# Patient Record
Sex: MALE | Race: WHITE
[De-identification: names, ages, dates, MRNs, and addresses within clinical notes are randomized per-mention and may not be internally consistent; named-entity substitution may affect disease eponyms.]

---

## 2017-04-22 ENCOUNTER — HOSPITAL ENCOUNTER (OUTPATIENT)
Dept: HOSPITAL 45 - C.LABPVFM | Age: 82
Discharge: HOME | End: 2017-04-22
Attending: GENERAL PRACTICE
Payer: COMMERCIAL

## 2017-04-22 DIAGNOSIS — I25.10: Primary | ICD-10-CM

## 2017-04-22 LAB
ALBUMIN/GLOB SERPL: 1.1 {RATIO} (ref 0.9–2)
ALP SERPL-CCNC: 63 U/L (ref 45–117)
ALT SERPL-CCNC: 31 U/L (ref 12–78)
ANION GAP SERPL CALC-SCNC: 8 MMOL/L (ref 3–11)
AST SERPL-CCNC: 24 U/L (ref 15–37)
BUN SERPL-MCNC: 26 MG/DL (ref 7–18)
BUN/CREAT SERPL: 23.5 (ref 10–20)
CALCIUM SERPL-MCNC: 9.1 MG/DL (ref 8.5–10.1)
CHLORIDE SERPL-SCNC: 111 MMOL/L (ref 98–107)
CHOLEST/HDLC SERPL: 2.9 {RATIO}
CO2 SERPL-SCNC: 25 MMOL/L (ref 21–32)
CREAT SERPL-MCNC: 1.1 MG/DL (ref 0.6–1.4)
GLOBULIN SER-MCNC: 3 GM/DL (ref 2.5–4)
GLUCOSE SERPL-MCNC: 99 MG/DL (ref 70–99)
GLUCOSE UR QL: 60 MG/DL
KETONES UR QL STRIP: 92 MG/DL
NITRITE UR QL STRIP: 95 MG/DL (ref 0–150)
PH UR: 171 MG/DL (ref 0–200)
POTASSIUM SERPL-SCNC: 4.3 MMOL/L (ref 3.5–5.1)
SODIUM SERPL-SCNC: 144 MMOL/L (ref 136–145)
VERY LOW DENSITY LIPOPROT CALC: 19 MG/DL

## 2017-05-06 ENCOUNTER — HOSPITAL ENCOUNTER (EMERGENCY)
Dept: HOSPITAL 45 - C.EDB | Age: 82
Discharge: HOME | End: 2017-05-06
Payer: COMMERCIAL

## 2017-05-06 VITALS
WEIGHT: 201.28 LBS | BODY MASS INDEX: 30.51 KG/M2 | BODY MASS INDEX: 30.51 KG/M2 | HEIGHT: 67.99 IN | HEIGHT: 67.99 IN | WEIGHT: 201.28 LBS

## 2017-05-06 VITALS — TEMPERATURE: 98.06 F

## 2017-05-06 VITALS — HEART RATE: 72 BPM | OXYGEN SATURATION: 93 %

## 2017-05-06 VITALS — OXYGEN SATURATION: 100 %

## 2017-05-06 VITALS — OXYGEN SATURATION: 96 % | HEART RATE: 101 BPM

## 2017-05-06 VITALS — SYSTOLIC BLOOD PRESSURE: 139 MMHG | DIASTOLIC BLOOD PRESSURE: 88 MMHG

## 2017-05-06 DIAGNOSIS — Z83.3: ICD-10-CM

## 2017-05-06 DIAGNOSIS — J44.9: Primary | ICD-10-CM

## 2017-05-06 DIAGNOSIS — Z82.49: ICD-10-CM

## 2017-05-06 LAB
ALBUMIN/GLOB SERPL: 1.1 {RATIO} (ref 0.9–2)
ALP SERPL-CCNC: 60 U/L (ref 45–117)
ALT SERPL-CCNC: 23 U/L (ref 12–78)
ANION GAP SERPL CALC-SCNC: 6 MMOL/L (ref 3–11)
APPEARANCE UR: (no result)
AST SERPL-CCNC: 27 U/L (ref 15–37)
BASOPHILS # BLD: 0.03 K/UL (ref 0–0.2)
BASOPHILS NFR BLD: 0.5 %
BILIRUB UR-MCNC: (no result) MG/DL
BUN SERPL-MCNC: 21 MG/DL (ref 7–18)
BUN/CREAT SERPL: 19.3 (ref 10–20)
CALCIUM SERPL-MCNC: 8.3 MG/DL (ref 8.5–10.1)
CHLORIDE SERPL-SCNC: 108 MMOL/L (ref 98–107)
CKMB/CK RATIO: 2.7 (ref 0–3)
CO2 SERPL-SCNC: 31 MMOL/L (ref 21–32)
COLOR UR: YELLOW
COMPLETE: YES
CREAT CL PREDICTED SERPL C-G-VRATE: 58.7 ML/MIN
CREAT SERPL-MCNC: 1.1 MG/DL (ref 0.6–1.4)
EOSINOPHIL NFR BLD AUTO: 168 K/UL (ref 130–400)
GLOBULIN SER-MCNC: 3 GM/DL (ref 2.5–4)
GLUCOSE SERPL-MCNC: 90 MG/DL (ref 70–99)
HCT VFR BLD CALC: 40.8 % (ref 42–52)
IG%: 1.1 %
IMM GRANULOCYTES NFR BLD AUTO: 22.2 %
LYMPHOCYTES # BLD: 1.23 K/UL (ref 1.2–3.4)
MANUAL MICROSCOPIC REQUIRED?: NO
MCH RBC QN AUTO: 32 PG (ref 25–34)
MCHC RBC AUTO-ENTMCNC: 33.3 G/DL (ref 32–36)
MCV RBC AUTO: 96 FL (ref 80–100)
MONOCYTES NFR BLD: 13 %
NEUTROPHILS # BLD AUTO: 3.1 %
NEUTROPHILS NFR BLD AUTO: 60.1 %
NITRITE UR QL STRIP: (no result)
PH UR STRIP: 5 [PH] (ref 4.5–7.5)
PMV BLD AUTO: 10.8 FL (ref 7.4–10.4)
POTASSIUM SERPL-SCNC: 4 MMOL/L (ref 3.5–5.1)
RBC # BLD AUTO: 4.25 M/UL (ref 4.7–6.1)
REVIEW REQ?: NO
SODIUM SERPL-SCNC: 145 MMOL/L (ref 136–145)
SP GR UR STRIP: 1.02 (ref 1–1.03)
URINE BILL WITH OR WITHOUT MIC: (no result)
URINE EPITHELIAL CELL AUTO: >30 /LPF (ref 0–5)
UROBILINOGEN UR-MCNC: (no result) MG/DL
WBC # BLD AUTO: 5.54 K/UL (ref 4.8–10.8)

## 2017-05-06 NOTE — EMERGENCY ROOM VISIT NOTE
History


Report prepared by [place]:  Cheri Silva


Under the Supervision of:  Dr. Arjun Casey M.D.


First contact with patient:  17:25


Chief Complaint:  RESPIRATORY PROBLEMS


Stated Complaint:  ADDED WEIGHT,DIFFICULTY BREATHING





History of Present Illness


The patient is an 81 year old male who presents to the Emergency Room with 

complaints of persistent SOB for the past few days. He has a history of CHF and 

is on Lasix which he takes every other day. For the past few days he has had to 

take Lasix everyday because of increasing weight gain and SOB. He felt short of 

breath when lying flat and noticed that his abdomen was swollen. He is also 

increasingly SOB when walking. He has gained 4-7 lbs in the last couple of 

days. He denies any chest pain or abdominal pain. He is not on oxygen at home. 

He has inhalers at home. He has a history of quadruple bypass and has a stent 

in place. He has a pacemaker.





   Source of History:  patient


   Onset:  few days ago


   Position:  other (respiratory)


   Quality:  other (SOB)


   Timing:  other (persistent)


   Modifying Factors (Worsening):  exertion, other (lying flat)


   Associated Symptoms:  No abdominal pain, No chest pain


Note:


Pt reports weight gain, swollen abdomen.





Review of Systems


See HPI for pertinent positives & negatives. A total of 10 systems reviewed and 

were otherwise negative.





Past Medical & Surgical


Medical Problems:


(1) Asthma


(2) Benign hypertension


(3) CAD (coronary artery disease)


(4) Colonoscopic polypectomy


(5) COPD (chronic obstructive pulmonary disease)


(6) Coronary artery bypass grafts x 4


(7) Coronary artery disease


(8) Duodenal ulcer


(9) Gastroesophageal reflux disease


(10) Hyperlipidemia


(11) Ischemic cardiomyopathy


(12) Non-ST elevated myocardial infarction


(13) Stented coronary artery


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


Surgical Problems:


(1) Hx of CABG


(2) S/P placement of cardiac pacemaker








Family History





Heart disease


Hypertension





Social History


Smoking Status:  Never Smoker


Alcohol Use:  none


Drug Use:  none


Marital Status:  


Housing Status:  lives with significant other


Occupation Status:  retired





Current/Historical Medications


Scheduled


Albuterol Sulfate (Albuterol Sulfate), 1 VIAL NEB BID


Ascorbic Acid (Vitamin C), 100 MG PO BID


Aspirin (Aspirin Ec), 81 MG PO DAILY


Cholecalciferol (Vitamin D3), 1 TAB PO QPM


Clopidogrel (Plavix), 75 MG PO DAILY


Coenzyme Q10 (Ubidecarenone) (Coq-10), 100 MG PO QPM


Fish Oil (Omega-3), 1 CAP PO BID


Fluticasone Furoate-Vilanterol (Breo Ellipta), 1 PUFF INH DAILY


Glucosamine Hydrochloride (Glucosamine), 1,500 MG PO BID


Isosorbide Mononitrate (Isosorbide Mononitrate ER), 60 MG PO DAILY


Lecithin (Lecithin), 1 DOSE PO DAILY


Losartan Potassium (Cozaar), 1 TAB PO QPM


Metoprolol Tartrate (Lopressor) (Lopressor), 50 MG PO BID


Mometasone Furoate-Formoterol (Dulera 200/5 Mcg), 2 PUFFS INH BID


Multivitamins/Minerals (Mvi With Minerals), 1 TAB PO DAILY


Nitroglycerin (Nitrostat), 0.4 MG UT PRN


Prednisone (Prednisone Tab), 0 PO DAILY


Ranitidine (Zantac), 75 MG PO QPM


Simvastatin (Zocor), 40 MG PO QPM


Terazosin Hcl (Hytrin), 2 MG PO QPM





Scheduled PRN


Albuterol Hfa (Ventolin Hfa), 1-2 PUFFS INH DAILY PRN for SOB/Wheezing


Furosemide (Lasix), 40 MG PO DAILY PRN for EDEMA





Allergies


Coded Allergies:  


     Fluticasone (Unverified  Adverse Reaction, Intermediate, DIZZY, 5/6/17)


     Milk Protein Extract (Unverified  Adverse Reaction, Intermediate, DIZZY, 5/ 6/17)


     Salmeterol (Unverified  Adverse Reaction, Intermediate, DIZZY, 5/6/17)





Physical Exam


Vital Signs











  Date Time  Temp Pulse Resp B/P Pulse Ox O2 Delivery O2 Flow Rate FiO2


 


5/6/17 20:12    139/88    


 


5/6/17 19:56  101 18  96 Room Air  


 


5/6/17 19:32  98      


 


5/6/17 18:48     99   


 


5/6/17 18:48  73 18 138/70 100 Room Air  


 


5/6/17 18:48     100   


 


5/6/17 18:34  72 20  93 Room Air  21


 


5/6/17 16:57 36.7 91 22 121/78 93 Room Air  











Physical Exam


GENERAL: Patient is a healthy-appearing well-nourished


HEAD: Normocephalic atraumatic


EYES: Ocular movements intact pupils equal and react to light


OROPHARYNX mucous membranes are moist no exudates present no erythema or edema 

present


NECK: Supple no nuchal rigidity


CHEST: Good equal expansion


LUNGS: Clear and equal to auscultation


CARDIAC: Normal S1 and S2


ABDOMEN: Soft nontender no guarding


BACK: No CVA tenderness


EXTREMITIES: No pain upon palpation normal muscle strength in all groups no 

clubbing cyanosis or edema


NEURO: Patient is following commands is answering questions appropriately. 

Alert and oriented x3 Cranial Nerves 2-12 grossly intact





Medical Decision & Procedures


ER Provider


Diagnostic Interpretation:


X-ray results as stated below per interpretation by me and the radiologist. 

Radiology results as stated below per my review and radiologist interpretation:





CHEST ONE VIEW PORTABLE





CLINICAL HISTORY: Pt c/o SOB dyspnea





COMPARISON STUDY:  8/5/2016





FINDINGS: Moderate stable cardiomegaly. Cardiac pacemaker/defibrillator. Prior


median sternotomy. Lungs are clear. 





IMPRESSION:  Moderate stable cardiomegaly. Otherwise negative study 





Electronically signed by:  Rip Krishnan M.D.


5/6/2017 6:30 PM





Dictated Date/Time:  5/6/2017 6:29 PM








CHEST CTA for PULMONARY ARTERIES





CT DOSE: 594.87 mGy.cm





HISTORY: Chest  dyspnea





TECHNIQUE: Multiaxial CT images of the chest were performed following the


intravenous administration of contrast to evaluate the pulmonary arteries.


Maximal intensity projection images were also obtained.





COMPARISON STUDY: None.





FINDINGS: There is a normal caliber thoracic aorta with no evidence for


dissection. There is no evidence for pulmonary embolus. No pleural effusions. No


pneumothorax. The liver and spleen are unremarkable. No mediastinal or hilar


lymphadenopathy. The central airways are patent. The lungs are clear. No focal


infiltrate. Large fixed lateral hernia. Prior median sternotomy. Cardiac


pacemaker.





IMPRESSION: 


No evidence for pulmonary embolus. Large fixed hiatal hernia. Lungs are clear.





Electronically signed by:  Rip Krishnan M.D.


5/6/2017 7:34 PM





Dictated Date/Time:  5/6/2017 7:32 PM





Laboratory Results


5/6/17 17:56








Red Blood Count 4.25, Mean Corpuscular Volume 96.0, Mean Corpuscular Hemoglobin 

32.0, Mean Corpuscular Hemoglobin Concent 33.3, Mean Platelet Volume 10.8, 

Neutrophils (%) (Auto) 60.1, Lymphocytes (%) (Auto) 22.2, Monocytes (%) (Auto) 

13.0, Eosinophils (%) (Auto) 3.1, Basophils (%) (Auto) 0.5, Neutrophils # (Auto

) 3.33, Lymphocytes # (Auto) 1.23, Monocytes # (Auto) 0.72, Eosinophils # (Auto

) 0.17, Basophils # (Auto) 0.03





5/6/17 17:56

















Test


  5/6/17


17:56 5/6/17


19:12


 


White Blood Count


  5.54 K/uL


(4.8-10.8) 


 


 


Red Blood Count


  4.25 M/uL


(4.7-6.1) 


 


 


Hemoglobin


  13.6 g/dL


(14.0-18.0) 


 


 


Hematocrit 40.8 % (42-52)  


 


Mean Corpuscular Volume


  96.0 fL


() 


 


 


Mean Corpuscular Hemoglobin


  32.0 pg


(25-34) 


 


 


Mean Corpuscular Hemoglobin


Concent 33.3 g/dl


(32-36) 


 


 


Platelet Count


  168 K/uL


(130-400) 


 


 


Mean Platelet Volume


  10.8 fL


(7.4-10.4) 


 


 


Neutrophils (%) (Auto) 60.1 %  


 


Lymphocytes (%) (Auto) 22.2 %  


 


Monocytes (%) (Auto) 13.0 %  


 


Eosinophils (%) (Auto) 3.1 %  


 


Basophils (%) (Auto) 0.5 %  


 


Neutrophils # (Auto)


  3.33 K/uL


(1.4-6.5) 


 


 


Lymphocytes # (Auto)


  1.23 K/uL


(1.2-3.4) 


 


 


Monocytes # (Auto)


  0.72 K/uL


(0.11-0.59) 


 


 


Eosinophils # (Auto)


  0.17 K/uL


(0-0.5) 


 


 


Basophils # (Auto)


  0.03 K/uL


(0-0.2) 


 


 


RDW Standard Deviation


  49.4 fL


(36.4-46.3) 


 


 


RDW Coefficient of Variation


  13.9 %


(11.5-14.5) 


 


 


Immature Granulocyte % (Auto) 1.1 %  


 


Immature Granulocyte # (Auto)


  0.06 K/uL


(0.00-0.02) 


 


 


Red Blood Cell Morphology Unremarkable  


 


Anion Gap


  6.0 mmol/L


(3-11) 


 


 


Est Creatinine Clear Calc


Drug Dose 58.7 ml/min 


  


 


 


Estimated GFR (


American) 73.1 


  


 


 


Estimated GFR (Non-


American 63.1 


  


 


 


BUN/Creatinine Ratio 19.3 (10-20)  


 


Calcium Level


  8.3 mg/dl


(8.5-10.1) 


 


 


Total Bilirubin


  0.5 mg/dl


(0.2-1) 


 


 


Aspartate Amino Transf


(AST/SGOT) 27 U/L (15-37) 


  


 


 


Alanine Aminotransferase


(ALT/SGPT) 23 U/L (12-78) 


  


 


 


Alkaline Phosphatase


  60 U/L


() 


 


 


Total Creatine Kinase


  89 U/L


() 


 


 


Creatine Kinase MB


  2.4 ng/ml


(0.5-3.6) 


 


 


Creatine Kinase MB Ratio 2.7 (0-3.0)  


 


Troponin I


  0.017 ng/ml


(0-0.045) 


 


 


Pro-B-Type Natriuretic Peptide


  612 pg/ml


(0-1800) 


 


 


Total Protein


  6.2 gm/dl


(6.4-8.2) 


 


 


Albumin


  3.2 gm/dl


(3.4-5.0) 


 


 


Globulin


  3.0 gm/dl


(2.5-4.0) 


 


 


Albumin/Globulin Ratio 1.1 (0.9-2)  


 


Chemistry Specimen Hemolysis   


 


Urine Color  YELLOW 


 


Urine Appearance  CLOUDY (CLEAR) 


 


Urine pH  5.0 (4.5-7.5) 


 


Urine Specific Gravity


  


  1.017


(1.000-1.030)


 


Urine Protein  NEG (NEG) 


 


Urine Glucose (UA)  NEG (NEG) 


 


Urine Ketones  TRACE (NEG) 


 


Urine Occult Blood  NEG (NEG) 


 


Urine Nitrite  NEG (NEG) 


 


Urine Bilirubin  NEG (NEG) 


 


Urine Urobilinogen  NEG (NEG) 


 


Urine Leukocyte Esterase  NEG (NEG) 


 


Urine WBC (Auto)  1-5 /hpf (0-5) 


 


Urine RBC (Auto)  0-4 /hpf (0-4) 


 


Urine Hyaline Casts (Auto)  1-5 /lpf (0-5) 


 


Urine Epithelial Cells (Auto)  >30 /lpf (0-5) 


 


Urine Bacteria (Auto)  NEG (NEG) 





Labs reviewed by ED physician.





Medications Administered











 Medications


  (Trade)  Dose


 Ordered  Sig/Carla


 Route  Start Time


 Stop Time Status Last Admin


Dose Admin


 


 Albuterol/


 Ipratropium


  (Duoneb)  12 ml  ONE  ONCE


 INH  5/6/17 17:45


 5/6/17 17:46 DC 5/6/17 17:45


12 ML


 


 Methylprednisolone


 Sodium Succinate


  (Solu-Medrol IV)  60 mg  NOW  STAT


 IV  5/6/17 19:48


 5/6/17 19:50 DC 5/6/17 20:01


60 MG


 


 Prednisone


  (PredniSONE TAB)  40 mg  NOW  STAT


 PO  5/6/17 20:07


 5/6/17 20:08 DC 5/6/17 20:10


40 MG











ECG


Indication:  SOB/dyspnea


Rate (beats per minute):  70


Rhythm:  other (dual paced rhythm)


Findings:  no acute ischemic change, no ectopy





ED Course


1726: Past medical records reviewed. The patient was evaluated in room B4B. A 

complete history and physical examination was performed. 





1745: Duoneb 12 ml INH.





1942: Upon reexamination the patient is resting comfortably. I discussed 

results and treatment plan with the patient and his family. They verbalize 

agreement and understanding. The patient is ready for discharge.





1948: Solu-Medrol IV 60 mg IV.





Medical Decision


Prior records/ancillary studies reviewed.


Triage Nursing notes reviewed.


Additional history obtained from the family.





The patient's history was concerning for respiratory difficulties.





Differential diagnosis:


Etiologies such as infections, reactive airway disease, pneumonia, pneumothorax

, COPD, CHF, cardiac ischemia, pulmonary embolism, musculoskeletal, 

gastrointestinal, as well as others were entertained.





This is an 81-year-old male who presents emergency department complaining of 4 

pound weight can.  The patient is concern that he is filled with fluid.  He has 

no evidence of pitting edema on exam.  He has no evidence of rails on exam.  He 

has no evidence of pulmonary edema on chest x-ray.  In addition the patient's 

BNP is normal.  The patient was given an hour-long breathing treatment in the 

emergency department.  He is not hypoxic.  Based on these multiple findings I 

felt that the patient as well as to be discharged home.  He was sent for CAT 

scan of the chest which did not show any evidence of a PE.  The patient will 

therefore be certain on Solu-Medrol and I feels well enough to be continued on 

prednisone for the next several days.  Patient and family are in agreement with 

the treatment plan.





Impression





 Primary Impression:  


 COPD (chronic obstructive pulmonary disease)





Scribe Attestation


The scribe's documentation has been prepared under my direction and personally 

reviewed by me in its entirety. I confirm that the note above accurately 

reflects all work, treatment, procedures, and medical decision making performed 

by me.





Departure Information


Dispostion


Home / Self-Care





Prescriptions





Prednisone (Prednisone Tab) 20 Mg Tab


0 PO DAILY, #7 TAB


   2 TABS DAILY FOR 2 DAYS, THEN 1 TAB DAILY FOR 2 DAYS, THEN 1/2 TAB


   DAILY FOR 2 DAYS.


   Prov: Arjun Casey MD         5/6/17





Referrals


Kirk Bryan M.D. (PCP)








Sumanth Trinidad MD





Forms


HOME CARE DOCUMENTATION FORM,                                                 

               IMPORTANT VISIT INFORMATION, WORK / SCHOOL INSTRUCTIONS





Patient Instructions


COPD - [facility], [facility]





Additional Instructions





Follow up with DR Trinidad's office


 


Continue to use inhalers and Lasix as directed











You have been examined and treated today on an emergency basis only. This is 

not a substitute for, or an effort to provide, complete comprehensive medical 

care. It is impossible to recognize and treat all injuries or illnesses in a 

single emergency department visit. It is therefore important that you follow up 

closely with Dr Bryan.  Call as soon as possible for an appointment.  





Thank you for your time and consideration.  I look forward to speaking with you 

again soon.  Please don't hesitate to call us if you have any questions.





Problem Qualifiers








 Primary Impression:  


 COPD (chronic obstructive pulmonary disease)


 COPD type:  unspecified COPD  Qualified Codes:  J44.9 - Chronic obstructive 

pulmonary disease, unspecified

## 2017-05-06 NOTE — DIAGNOSTIC IMAGING REPORT
CHEST CTA for PULMONARY ARTERIES



CT DOSE: 594.87 mGy.cm



HISTORY: Chest  dyspnea



TECHNIQUE: Multiaxial CT images of the chest were performed following the

intravenous administration of contrast to evaluate the pulmonary arteries.

Maximal intensity projection images were also obtained.



COMPARISON STUDY: None.



FINDINGS: There is a normal caliber thoracic aorta with no evidence for

dissection. There is no evidence for pulmonary embolus. No pleural effusions. No

pneumothorax. The liver and spleen are unremarkable. No mediastinal or hilar

lymphadenopathy. The central airways are patent. The lungs are clear. No focal

infiltrate. Large fixed lateral hernia. Prior median sternotomy. Cardiac

pacemaker.



IMPRESSION: 

No evidence for pulmonary embolus. Large fixed hiatal hernia. Lungs are clear.







Electronically signed by:  Rip Krishnan M.D.

5/6/2017 7:34 PM



Dictated Date/Time:  5/6/2017 7:32 PM

## 2017-05-06 NOTE — DIAGNOSTIC IMAGING REPORT
CHEST ONE VIEW PORTABLE



CLINICAL HISTORY: Pt c/o SOB dyspnea



COMPARISON STUDY:  8/5/2016



FINDINGS: Moderate stable cardiomegaly. Cardiac pacemaker/defibrillator. Prior

median sternotomy. Lungs are clear. 



IMPRESSION:  Moderate stable cardiomegaly. Otherwise negative study 









Electronically signed by:  Rip Krishnan M.D.

5/6/2017 6:30 PM



Dictated Date/Time:  5/6/2017 6:29 PM

## 2018-02-12 ENCOUNTER — HOSPITAL ENCOUNTER (INPATIENT)
Dept: HOSPITAL 45 - C.EDB | Age: 83
LOS: 2 days | Discharge: HOME | DRG: 190 | End: 2018-02-14
Attending: HOSPITALIST | Admitting: HOSPITALIST
Payer: COMMERCIAL

## 2018-02-12 VITALS
OXYGEN SATURATION: 94 % | DIASTOLIC BLOOD PRESSURE: 57 MMHG | SYSTOLIC BLOOD PRESSURE: 100 MMHG | TEMPERATURE: 80.24 F | HEART RATE: 75 BPM

## 2018-02-12 VITALS
HEIGHT: 68 IN | BODY MASS INDEX: 29.9 KG/M2 | HEIGHT: 68 IN | WEIGHT: 197.31 LBS | WEIGHT: 197.31 LBS | BODY MASS INDEX: 29.9 KG/M2

## 2018-02-12 DIAGNOSIS — Z79.82: ICD-10-CM

## 2018-02-12 DIAGNOSIS — K21.9: ICD-10-CM

## 2018-02-12 DIAGNOSIS — I50.22: ICD-10-CM

## 2018-02-12 DIAGNOSIS — J44.0: ICD-10-CM

## 2018-02-12 DIAGNOSIS — Z88.8: ICD-10-CM

## 2018-02-12 DIAGNOSIS — E66.3: ICD-10-CM

## 2018-02-12 DIAGNOSIS — J20.8: ICD-10-CM

## 2018-02-12 DIAGNOSIS — I25.10: ICD-10-CM

## 2018-02-12 DIAGNOSIS — J06.9: ICD-10-CM

## 2018-02-12 DIAGNOSIS — J45.909: ICD-10-CM

## 2018-02-12 DIAGNOSIS — Z95.0: ICD-10-CM

## 2018-02-12 DIAGNOSIS — J96.20: ICD-10-CM

## 2018-02-12 DIAGNOSIS — Z79.899: ICD-10-CM

## 2018-02-12 DIAGNOSIS — Z95.1: ICD-10-CM

## 2018-02-12 DIAGNOSIS — J44.1: Primary | ICD-10-CM

## 2018-02-12 DIAGNOSIS — E78.5: ICD-10-CM

## 2018-02-12 DIAGNOSIS — Z51.81: ICD-10-CM

## 2018-02-12 DIAGNOSIS — I25.2: ICD-10-CM

## 2018-02-12 DIAGNOSIS — Z82.49: ICD-10-CM

## 2018-02-12 DIAGNOSIS — Z91.011: ICD-10-CM

## 2018-02-12 DIAGNOSIS — I11.0: ICD-10-CM

## 2018-02-12 LAB
BASOPHILS # BLD: 0.02 K/UL (ref 0–0.2)
BASOPHILS NFR BLD: 0.3 %
BUN SERPL-MCNC: 20 MG/DL (ref 7–18)
CALCIUM SERPL-MCNC: 8.8 MG/DL (ref 8.5–10.1)
CK MB SERPL-MCNC: 1.2 NG/ML (ref 0.5–3.6)
CO2 SERPL-SCNC: 27 MMOL/L (ref 21–32)
CREAT SERPL-MCNC: 1.18 MG/DL (ref 0.6–1.4)
EOS ABS #: 0.06 K/UL (ref 0–0.5)
EOSINOPHIL NFR BLD AUTO: 133 K/UL (ref 130–400)
GLUCOSE SERPL-MCNC: 100 MG/DL (ref 70–99)
HCT VFR BLD CALC: 42.5 % (ref 42–52)
HGB BLD-MCNC: 14 G/DL (ref 14–18)
IG#: 0.04 K/UL (ref 0–0.02)
IMM GRANULOCYTES NFR BLD AUTO: 10.8 %
INFLUENZA B ANTIGEN: (no result)
LYMPHOCYTES # BLD: 0.71 K/UL (ref 1.2–3.4)
MCH RBC QN AUTO: 31.4 PG (ref 25–34)
MCHC RBC AUTO-ENTMCNC: 32.9 G/DL (ref 32–36)
MCV RBC AUTO: 95.3 FL (ref 80–100)
MONO ABS #: 0.68 K/UL (ref 0.11–0.59)
MONOCYTES NFR BLD: 10.3 %
NEUT ABS #: 5.08 K/UL (ref 1.4–6.5)
NEUTROPHILS # BLD AUTO: 0.9 %
NEUTROPHILS NFR BLD AUTO: 77.1 %
PMV BLD AUTO: 11.9 FL (ref 7.4–10.4)
POTASSIUM SERPL-SCNC: 3.8 MMOL/L (ref 3.5–5.1)
RED CELL DISTRIBUTION WIDTH CV: 15.3 % (ref 11.5–14.5)
RED CELL DISTRIBUTION WIDTH SD: 53 FL (ref 36.4–46.3)
SODIUM SERPL-SCNC: 141 MMOL/L (ref 136–145)
WBC # BLD AUTO: 6.59 K/UL (ref 4.8–10.8)

## 2018-02-12 RX ADMIN — IPRATROPIUM BROMIDE AND ALBUTEROL SULFATE SCH ML: .5; 3 SOLUTION RESPIRATORY (INHALATION) at 02:06

## 2018-02-12 NOTE — HISTORY AND PHYSICAL
History & Physical


Date & Time of Service:


Feb 12, 2018 at 21:17


Chief Complaint:


Flu Like, Fever, Difficult Breathing


Primary Care Physician:


No Doctor, Assigned


History of Present Illness


Source:  patient, hospital records


83 y/o M Hx HTN, HPL, CAD, asthma, chronic systolic CHF.  Pt presents with SOB, 

fever and a productive cough. He denies CP, N/V, diarrhea or dysuria.  A fever 

of 38.7 was confirmed on arrival to the ER.





Past Medical/Surgical History


1) Systolic CHF - EF 25-20% - global hypokinesis - echo 2016


2) CAD - 4V CABG 1989, STEMI 2005 leading to RCA stent.


3) Pacer/defib


4) HTN


5) HPL


6) GERD


7) Duodenal ulcer


8) Asthma





Surgical:


1) CABG 1989


2) THR


3) Pacer/Defib





Family History





Heart disease


Hypertension





Social History


Smoking Status:  Never Smoker


Drug Use:  none


Marital Status:  


Housing status:  lives with family


Occupational Status:  retired





Immunizations


History of Influenza Vaccine:  No


Influenza Vaccine Date:  Oct 16, 2011


History of Tetanus Vaccine?:  Yes


Tetanus Immunization Date:  Jan 21, 2004


History of Pneumococcal:  Yes


Pneumococcal Date:  Dec 21, 2005


History of Hepatitis B Vaccine:  No





Multi-Drug Resistant Organisms


History of MDRO:  No





Allergies


Coded Allergies:  


     Fluticasone (Unverified  Adverse Reaction, Intermediate, DIZZY, 2/12/18)


     Milk Protein Extract (Unverified  Adverse Reaction, Intermediate, DIZZY, 2/ 12/18)


     Salmeterol (Unverified  Adverse Reaction, Intermediate, DIZZY, 2/12/18)





Home Medications


Scheduled


Albuterol Sulfate (Albuterol Sulfate), 1 VIAL NEB BID


Ascorbic Acid (Vitamin C), 100 MG PO BID


Aspirin (Aspirin Ec), 81 MG PO DAILY


Cholecalciferol (Vitamin D3), 1,000 MG PO DAILY


Clopidogrel (Plavix), 75 MG PO DAILY


Coenzyme Q10 (Ubidecarenone) (Coq-10), 100 MG PO QPM


Fish Oil (Omega-3), 1,200 MG PO DAILY


Glucosamine-Chondroitin-Vit C- (Glucosamine Chondroitin), 1 CAP PO BID


Isosorbide Mononitrate Ext Rel (Imdur Ext Rel), 30 MG PO DAILY


Lecithin (Lecithin 3500), 1,200 MG PO DAILY


Losartan Potassium (Cozaar), 25 MG PO QPM


Metoprolol Tartrate (Lopressor) (Lopressor), 100 MG PO DAILY


Mometasone Furoate-Formoterol (Dulera 200/5 Mcg), 2 PUFFS INH BID


Multivitamin (Multivitamin), 1 TAB PO DAILY


Nitroglycerin (Nitrostat), 0.4 MG UT PRN


Ranitidine (Zantac), 75 MG PO QPM


Simvastatin (Zocor), 40 MG PO QPM


Terazosin Hcl (Hytrin), 2 MG PO QPM





Scheduled PRN


Albuterol Hfa (Ventolin Hfa), 1-2 PUFFS INH DAILY PRN for SOB/Wheezing


Furosemide (Lasix), 45 MG PO DAILY PRN for EDEMA





Review of Systems


Constitutional:  + fever, + chills, + weakness, + fatigue


Eyes:  No worsening of vision


ENT:  No hearing loss, No unusual epistaxis, No nasal symptoms


Respiratory:  + cough, + sputum, + wheezing, + shortness of breath, + dyspnea 

on exertion, + dyspnea at rest


Cardiovascular:  No chest pain, No orthopnea, No PND


Abdomen:  No pain, No nausea, No vomiting


Musculoskeletal:  No joint pain


Genitourinary - Male:  No hematuria, No dysuria


Neurologic:  + weakness, No memory loss, No paralysis


Psychiatric:  No depression symptoms


Endocrine:  + fatigue


Hematologic / Lymphatic:  No abnormal bleeding/bruising


Integumentary:  No rash


Allergic / Immunologic:  No environmental allergies





Physical Exam


Vital Signs











  Date Time  Temp Pulse Resp B/P (MAP) Pulse Ox O2 Delivery O2 Flow Rate FiO2


 


2/12/18 20:48 37.2 78 18 100/60 91 Room Air  


 


2/12/18 19:14  80      


 


2/12/18 18:02 38.7 91 20 112/66 93 Room Air  








General Appearance:  no apparent distress, + pertinent finding (PLeasant, 

overweight, elderly male in no distress - AAO)


Head:  normocephalic


Eyes:  normal inspection, EOMI


ENT:  normal ENT inspection, pharynx normal


Neck:  supple, no JVD


Respiratory/Chest:  chest non-tender, lungs clear, normal breath sounds


Cardiovascular:  regular rate, rhythm, no edema, no gallop, no JVD


Abdomen/GI:  normal bowel sounds, non tender, soft


Back:  normal inspection, no CVA tenderness


Extremities/Musculoskelatal:  normal inspection, no calf tenderness, normal 

capillary refill, no pedal edema


Neurologic/Psych:  CNs II-XII nml as tested, no motor/sensory deficits, alert, 

oriented x 3


Skin:  normal color, warm/dry





Diagnostics


Laboratory Results





Results Past 24 Hours








Test


  2/12/18


18:47 2/12/18


19:04 2/12/18


19:05 2/12/18


19:23 Range/Units


 


 


White Blood Count 6.59    4.8-10.8  K/uL


 


Red Blood Count 4.46    4.7-6.1  M/uL


 


Hemoglobin 14.0    14.0-18.0  g/dL


 


Hematocrit 42.5    42-52  %


 


Mean Corpuscular Volume 95.3      fL


 


Mean Corpuscular Hemoglobin 31.4    25-34  pg


 


Mean Corpuscular Hemoglobin


Concent 32.9


  


  


  


  32-36  g/dl


 


 


Platelet Count 133    130-400  K/uL


 


Mean Platelet Volume 11.9    7.4-10.4  fL


 


Neutrophils (%) (Auto) 77.1     %


 


Lymphocytes (%) (Auto) 10.8     %


 


Monocytes (%) (Auto) 10.3     %


 


Eosinophils (%) (Auto) 0.9     %


 


Basophils (%) (Auto) 0.3     %


 


Neutrophils # (Auto) 5.08    1.4-6.5  K/uL


 


Lymphocytes # (Auto) 0.71    1.2-3.4  K/uL


 


Monocytes # (Auto) 0.68    0.11-0.59  K/uL


 


Eosinophils # (Auto) 0.06    0-0.5  K/uL


 


Basophils # (Auto) 0.02    0-0.2  K/uL


 


RDW Standard Deviation 53.0    36.4-46.3  fL


 


RDW Coefficient of Variation 15.3    11.5-14.5  %


 


Immature Granulocyte % (Auto) 0.6     %


 


Immature Granulocyte # (Auto) 0.04    0.00-0.02  K/uL


 


Sodium Level 141    136-145  mmol/L


 


Potassium Level 3.8    3.5-5.1  mmol/L


 


Chloride Level 107      mmol/L


 


Carbon Dioxide Level 27    21-32  mmol/L


 


Anion Gap 7.0    3-11  mmol/L


 


Blood Urea Nitrogen 20    7-18  mg/dl


 


Creatinine


  1.18


  


  


  


  0.60-1.40


mg/dl


 


Est Creatinine Clear Calc


Drug Dose 53.5


  


  


  


   ml/min


 


 


Estimated GFR (


American) 66.7


  


  


  


   


 


 


Estimated GFR (Non-


American 57.5


  


  


  


   


 


 


BUN/Creatinine Ratio 16.6    10-20  


 


Random Glucose 100    70-99  mg/dl


 


Calcium Level 8.8    8.5-10.1  mg/dl


 


Creatine Kinase MB 1.2    0.5-3.6  ng/ml


 


Troponin I 0.045    0-0.045  ng/ml


 


Creatine Kinase MB Ratio     0-3.0  


 


Influenza Type A Antigen   Neg for Influ A  NEG  


 


Influenza Type B Antigen   Neg for Influ B  NEG  


 


Lactic Acid Level    1.2 0.4-2.0  mmol/L


 


Test


  2/12/18


19:35 


  


  


  Range/Units


 


 


Procalcitonin < 0.05    0-0.5  ng/ml








Microbiology Results


2/12/18 Blood Culture, Received


          Pending


2/12/18 Blood Culture, Received


          Pending





Diagnostic Radiology


CXR:


Chronic and postoperative change. No acute process.





EKG


BV pacing





Impression


Assessment and Plan


83 y/o M Hx HTN, HPL, CAD, COPD, chronic systolic CHF.  Pt presents with SOB, 

fever and a productive cough. He denies CP, N/V, diarrhea or dysuria. 





1) Acute onset cough, fever, SOB, Hx COPD - considering his symptoms, age and 

lack of evidence for bacterial pneumonia, we will treat with Tamiflu pending 

PCR.  Can DC if negative.  Pt placed on breathing treatments and an 02 protocol 

as needed.  He is not exhibiting hypoxia or wheezing so that we will hold off 

on steroids presently.  





2) CHF - clinically euvolemic - he takes Lasix PRN only - daily weights 

requested, assess volume status QAM.  Cont B blocker, ARB.





3) CAD - cont B blocker, ASA, Plavix, statin, Imdur - Trop is high-normal - EKG 

is nondiagnostic - will recheck at 6 hr interval





4) HTN, HPL - cont B blocker, ARB, Statin, Imdur








Full code - Heparin prophylaxis 


Total time for this admit including review of labs, meds, imaging, records, EKG 

- discussion with pt/family, ER attending - 38 min





Level of Care


Telemetry





Resuscitation Status


FULL RESUSCITATION





VTE Prophylaxis


Given or contraindicated:  Unfractionated heparin SQ

## 2018-02-12 NOTE — EMERGENCY ROOM VISIT NOTE
History


First contact with patient:  18:29


Chief Complaint:  FLU LIKE SX


Stated Complaint:  FLU LIKE, FEVER, DIFFICULT BREATHING





History of Present Illness


The patient is a 82 year old male who presents to the Emergency Room with 

complaints of acute onset of productive cough associated with dyspnea and chest 

pain secondary to coughing, some nasal congestion, weakness and fevers/chills 

in last 24 hours.


Patient has not had a sore throat, sinus pain, ear pain, palpitations, 

abdominal pain, lower extremity swelling or rashes.


He is tolerating diet without nausea or vomiting, but admits to diminished 

appetite with decreased fluid intake. He does describe some urinary symptoms. 

he is stooling appropriately. 


No increased use of Dulera or albuterol. Some PND recently. No orthopnea. 

Ambulating exacerbates symptoms and he has noted decreased exercise tolerance 

in last month. 


Patient lives at home. No sick contacts. Never smoker but diagnosis of COPD 

from second hand exposure. Does not use CPAP/BiPap. 


Has h/o CHF, has a pacemaker/with defibrillator. MI 1985, CABG x4 1989, stent 

2005, GI bleed & MI in 2013, pacer/defib 2016, MI 2016, COPD/asthma. 


Follows cardiologist, Dr. Bautista - last appt 1.5 months ago, last echo 5/16.





   Source of History:  patient, family


   History Limited By:  dementia


   Onset:  24 hours ago


   Position:  chest


   Symptom Intensity:  moderate


   Modifying Factors (Worsening):  exertion, other (coughing)


   Modifying Factors (Relieving):  rest


   Associated Symptoms:  + fevers, + chills, + chest pain, + SOB, + fatigue





Review of Systems


See HPI for pertinent positives and negatives.  A total of ten systems were 

reviewed and were otherwise negative.





Past Medical/Surgical History


Medical Problems:


(1) Asthma


(2) Benign hypertension


(3) CAD (coronary artery disease)


(4) Colonoscopic polypectomy


(5) COPD (chronic obstructive pulmonary disease)


(6) Coronary artery bypass grafts x 4


(7) Coronary artery disease


(8) Duodenal ulcer


(9) Fever


(10) Gastroesophageal reflux disease


(11) Hyperlipidemia


(12) Ischemic cardiomyopathy


(13) Non-ST elevated myocardial infarction


(14) Shortness of breath


(15) Stented coronary artery


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


Surgical Problems:


(1) Hx of CABG


(2) S/P placement of cardiac pacemaker








Family History





Heart disease


Hypertension





Social History


Smoking Status:  Never Smoker


Alcohol Use:  none


Drug Use:  none


Marital Status:  


Housing Status:  lives with significant other


Occupation Status:  retired





Current/Historical Medications


Scheduled


Albuterol Sulfate (Albuterol Sulfate), 1 VIAL NEB BID


Ascorbic Acid (Vitamin C), 100 MG PO BID


Aspirin (Aspirin Ec), 81 MG PO DAILY


Cholecalciferol (Vitamin D3), 1,000 MG PO DAILY


Clopidogrel (Plavix), 75 MG PO DAILY


Coenzyme Q10 (Ubidecarenone) (Coq-10), 100 MG PO QPM


Fish Oil (Omega-3), 1,200 MG PO DAILY


Glucosamine-Chondroitin-Vit C- (Glucosamine Chondroitin), 1 CAP PO BID


Isosorbide Mononitrate Ext Rel (Imdur Ext Rel), 30 MG PO DAILY


Lecithin (Lecithin 3500), 1,200 MG PO DAILY


Losartan Potassium (Cozaar), 25 MG PO QPM


Metoprolol Tartrate (Lopressor) (Lopressor), 100 MG PO DAILY


Mometasone Furoate-Formoterol (Dulera 200/5 Mcg), 2 PUFFS INH BID


Multivitamin (Multivitamin), 1 TAB PO DAILY


Nitroglycerin (Nitrostat), 0.4 MG UT PRN


Ranitidine (Zantac), 75 MG PO QPM


Simvastatin (Zocor), 40 MG PO QPM


Terazosin Hcl (Hytrin), 2 MG PO QPM





Scheduled PRN


Albuterol Hfa (Ventolin Hfa), 1-2 PUFFS INH DAILY PRN for SOB/Wheezing


Furosemide (Lasix), 45 MG PO DAILY PRN for EDEMA





Physical Exam


Vital Signs











  Date Time  Temp Pulse Resp B/P (MAP) Pulse Ox O2 Delivery O2 Flow Rate FiO2


 


2/12/18 20:48 37.2 78 18 100/60 91 Room Air  


 


2/12/18 19:14  80      


 


2/12/18 18:02 38.7 91 20 112/66 93 Room Air  











Physical Exam


GENERAL: alert, lying in bed, mild acute distress, non-toxic 


HEAD: Normocephalic, atraumatic. No sinus tenderness.


EYES: PERRL, EOMI, normal sclera and conjunctiva


OROPHARYNX: No exudate, no erythema. Lips, buccal mucosa, and tongue normal and 

mucous membranes are tacky


NECK: Supple, no nuchal rigidity, no adenopathy, non-tender


LUNGS: No reproducible chest tenderness. Normal chest wall mechanics. Increased 

work of breathing with use of some accessory muscles. Lungs with diffuse rhonchi

, wheezing and prolonged expiratory phase. No crackles


HEART: RRR, S1 and S2 normal, no murmurs appreciated


ABDOMEN: Soft, non-tender, normo-active bowel sounds, some distension, no masses

, no rebound or guarding. 


BACK: Back is symmetrical on inspection, no deformities, no midline tenderness, 

no CVA tenderness. 


SKIN: Warm, pink, dry. No erythema, rashes, or bruising.


EXTREMITIES: Grossly normal. Moving all 4 limbs, strength 5/5. +1 pitting 

edema. Calves non tender.


NEURO: Alert, Ox3. No focal deficits. Normal sensorium, cranial nerves II-XII 

grossly intact, normal speech.


PSYCH: Mood and affect appropriate.





Medical Decision & Procedures


ER Provider


Diagnostic Interpretation:


CHEST ONE VIEW PORTABLE





CLINICAL HISTORY: SOB dyspnea





COMPARISON STUDY:  11/3/2017





FINDINGS: Mild stable cardiomegaly. Prior median sternotomy. Cardiac


pacemaker/defibrillator in good position. Small fixed lateral hernia. Lungs are


clear. Diaphragms smooth. 





IMPRESSION:  Chronic and postoperative change. No acute process.





Laboratory Results


2/12/18 18:47








Red Blood Count 4.46, Mean Corpuscular Volume 95.3, Mean Corpuscular Hemoglobin 

31.4, Mean Corpuscular Hemoglobin Concent 32.9, Mean Platelet Volume 11.9, 

Neutrophils (%) (Auto) 77.1, Lymphocytes (%) (Auto) 10.8, Monocytes (%) (Auto) 

10.3, Eosinophils (%) (Auto) 0.9, Basophils (%) (Auto) 0.3, Neutrophils # (Auto

) 5.08, Lymphocytes # (Auto) 0.71, Monocytes # (Auto) 0.68, Eosinophils # (Auto

) 0.06, Basophils # (Auto) 0.02





2/12/18 18:47

















Test


  2/12/18


18:47 2/12/18


19:04 2/12/18


19:05 2/12/18


19:23


 


White Blood Count


  6.59 K/uL


(4.8-10.8) 


  


  


 


 


Red Blood Count


  4.46 M/uL


(4.7-6.1) 


  


  


 


 


Hemoglobin


  14.0 g/dL


(14.0-18.0) 


  


  


 


 


Hematocrit 42.5 % (42-52)    


 


Mean Corpuscular Volume


  95.3 fL


() 


  


  


 


 


Mean Corpuscular Hemoglobin


  31.4 pg


(25-34) 


  


  


 


 


Mean Corpuscular Hemoglobin


Concent 32.9 g/dl


(32-36) 


  


  


 


 


Platelet Count


  133 K/uL


(130-400) 


  


  


 


 


Mean Platelet Volume


  11.9 fL


(7.4-10.4) 


  


  


 


 


Neutrophils (%) (Auto) 77.1 %    


 


Lymphocytes (%) (Auto) 10.8 %    


 


Monocytes (%) (Auto) 10.3 %    


 


Eosinophils (%) (Auto) 0.9 %    


 


Basophils (%) (Auto) 0.3 %    


 


Neutrophils # (Auto)


  5.08 K/uL


(1.4-6.5) 


  


  


 


 


Lymphocytes # (Auto)


  0.71 K/uL


(1.2-3.4) 


  


  


 


 


Monocytes # (Auto)


  0.68 K/uL


(0.11-0.59) 


  


  


 


 


Eosinophils # (Auto)


  0.06 K/uL


(0-0.5) 


  


  


 


 


Basophils # (Auto)


  0.02 K/uL


(0-0.2) 


  


  


 


 


RDW Standard Deviation


  53.0 fL


(36.4-46.3) 


  


  


 


 


RDW Coefficient of Variation


  15.3 %


(11.5-14.5) 


  


  


 


 


Immature Granulocyte % (Auto) 0.6 %    


 


Immature Granulocyte # (Auto)


  0.04 K/uL


(0.00-0.02) 


  


  


 


 


Anion Gap


  7.0 mmol/L


(3-11) 


  


  


 


 


Est Creatinine Clear Calc


Drug Dose 53.5 ml/min 


  


  


  


 


 


Estimated GFR (


American) 66.7 


  


  


  


 


 


Estimated GFR (Non-


American 57.5 


  


  


  


 


 


BUN/Creatinine Ratio 16.6 (10-20)    


 


Calcium Level


  8.8 mg/dl


(8.5-10.1) 


  


  


 


 


Creatine Kinase MB


  1.2 ng/ml


(0.5-3.6) 


  


  


 


 


Troponin I


  0.045 ng/ml


(0-0.045) 


  


  


 


 


Creatine Kinase MB Ratio   (0-3.0)   


 


Influenza Type A Antigen


  


  


  Neg for Influ


A (NEG) 


 


 


Influenza Type B Antigen


  


  


  Neg for Influ


B (NEG) 


 


 


Lactic Acid Level


  


  


  


  1.2 mmol/L


(0.4-2.0)


 


Test


  2/12/18


19:35 


  


  


 


 


Procalcitonin


  < 0.05 ng/ml


(0-0.5) 


  


  


 











Medications Administered











 Medications


  (Trade)  Dose


 Ordered  Sig/Carla


 Route  Start Time


 Stop Time Status Last Admin


Dose Admin


 


 Acetaminophen


  (Tylenol Tab)  1,000 mg  NOW  STAT


 PO  2/12/18 19:33


 2/12/18 19:34 DC 2/12/18 19:47


1,000 MG


 


 Albuterol Sulfate


  (Ventolin 0.5%


 2.5MG/0.5ML Neb)  2.5 mg  STK-MED ONCE


 INH  2/12/18 19:42


 2/12/18 19:43 DC 2/12/18 19:49


2.5 MG


 


 Methylprednisolone


 Sodium Succinate


 60 mg/Syringe  0.96 ml @ 


 1.5 mls/min  NOW  STAT


 IV  2/12/18 20:16


 2/12/18 20:17 DC 2/12/18 20:48


1.5 MLS/MIN


 


 Albuterol/


 Ipratropium


  (Duoneb)  3 ml  ONE  STAT


 INH  2/12/18 20:16


 2/12/18 20:17 DC 2/12/18 20:48


3 ML











ECG


Rate (beats per minute):  80


Rhythm:  normal sinus


Findings:  PVC, paced rhythm


Comparison ECG Date:  


Change:


Atrial-sensed ventricular-paced rhythm with occasional Premature ventricular 

complexes


Biventricular pacemaker detected


Abnormal ECG


HR 80, QTc 516





ED Course


18:52 Patient assessed and thorough history and clinical exam performed


19:04 Labs and imaging ordered


19:47 Tylenol and Duonebs ordered


20:12 Patient reassessed and not deemed significantly improved


20:42 Duoneb and methylprednisone ordered


21:07 Consult placed to hospitalist





Medical Decision


The patient's care and disposition was discussed with Dr. Casey, Attending 

ED Physician. 





Prior records/ancillary studies reviewed.


Triage Nursing notes reviewed.


Additional history obtained from patient's friends.


The patient's history was concerning for fever, dyspnea and fatigue.





The patient was evaluated in room C9. A complete history and physical exam was 

performed.


Differential diagnosis: Etiologies such as viral syndrome, otitis, pharyngitis, 

pneumonia, influenza, meningitis, urinary tract infection, sepsis, bacteremia, 

as well as others were entertained.


Patient was given PO Tylenol and DuoNebs for symptom relief. Additionally, 

500mL of IV normal saline was given to gently improve hydration status given 

reduced EF of 25-30%.


On reassessment the patient felt minimally better, still feeling dyspneic with 

saturations 91-92%


Lab work was performed. CBC, BMP, LFT and were all within normal limits. 

Troponin was mildly elevated at 0.045.


EKG demonstrated paced rhythm and prolonged QT


CXR reported no acute pulmonary issues


Urinalysis was ordered but uncollected as patient did not void during his time 

in the ED, likely secondary to dehydration


Likely diagnosis is COPD exacerbation secondary to viral URTI. 


On reassessment the patient still having increased work of breathing, and lungs 

extremely wheezy. Provided additional Duoneb and IV methylprednisolone.


By the evaluation outlined above emergent etiologies such as otitis, pharyngitis

, pneumonia, meningitis,sepsis, bacteremia, as well as others were deemed 

relatively unlikely. 


The patient and his family were informed about the findings as listed above. 

All questions were answered and they were pleased with the treatment.


A consultation was placed with the [facility] hospitalist. The case was 

discussed and diagnostics were reviewed.


The hospitalist agreeable for further evaluation/





Medication Reconcilliation


Current Medication List:  was personally reviewed by me





Blood Pressure Screening


Patient's blood pressure:  Low blood pressure





Impression





 Primary Impression:  


 COPD with exacerbation


 Additional Impression:  


 Viral upper respiratory illness





Departure Information


Dispostion


Being Evaluated By Hospitalist





Condition


FAIR





Referrals


Kirk Bryan M.D. (PCP)





Patient Instructions


My [facility]





Resident Tracking


Resident Involvement:  Resident Care Provided


Care Provided:  Adult ED





Problem Qualifiers

## 2018-02-12 NOTE — DIAGNOSTIC IMAGING REPORT
CHEST ONE VIEW PORTABLE



CLINICAL HISTORY: SOB dyspnea



COMPARISON STUDY:  11/3/2017



FINDINGS: Mild stable cardiomegaly. Prior median sternotomy. Cardiac

pacemaker/defibrillator in good position. Small fixed lateral hernia. Lungs are

clear. Diaphragms smooth. 



IMPRESSION:  Chronic and postoperative change. No acute process. 











The above report was generated using voice recognition software.  It may contain

grammatical, syntax or spelling errors.









Electronically signed by:  Rip Krishnan M.D.

2/12/2018 7:18 PM



Dictated Date/Time:  2/12/2018 7:16 PM

## 2018-02-13 VITALS — HEART RATE: 76 BPM | OXYGEN SATURATION: 94 %

## 2018-02-13 VITALS — OXYGEN SATURATION: 91 % | HEART RATE: 75 BPM

## 2018-02-13 VITALS — OXYGEN SATURATION: 98 % | HEART RATE: 73 BPM

## 2018-02-13 VITALS
SYSTOLIC BLOOD PRESSURE: 100 MMHG | TEMPERATURE: 98.42 F | OXYGEN SATURATION: 91 % | DIASTOLIC BLOOD PRESSURE: 60 MMHG | HEART RATE: 81 BPM

## 2018-02-13 VITALS — OXYGEN SATURATION: 91 %

## 2018-02-13 VITALS
HEART RATE: 78 BPM | SYSTOLIC BLOOD PRESSURE: 112 MMHG | TEMPERATURE: 97.34 F | OXYGEN SATURATION: 92 % | DIASTOLIC BLOOD PRESSURE: 63 MMHG

## 2018-02-13 VITALS — OXYGEN SATURATION: 92 %

## 2018-02-13 VITALS
TEMPERATURE: 98.42 F | HEART RATE: 70 BPM | SYSTOLIC BLOOD PRESSURE: 132 MMHG | OXYGEN SATURATION: 92 % | DIASTOLIC BLOOD PRESSURE: 75 MMHG

## 2018-02-13 VITALS — HEART RATE: 72 BPM | OXYGEN SATURATION: 91 %

## 2018-02-13 LAB
BUN SERPL-MCNC: 26 MG/DL (ref 7–18)
CALCIUM SERPL-MCNC: 8.3 MG/DL (ref 8.5–10.1)
CO2 SERPL-SCNC: 23 MMOL/L (ref 21–32)
CREAT SERPL-MCNC: 1.32 MG/DL (ref 0.6–1.4)
EOSINOPHIL NFR BLD AUTO: 122 K/UL (ref 130–400)
FLUAV RNA SPEC QL NAA+PROBE: (no result)
FLUBV RNA SPEC QL NAA+PROBE: (no result)
GLUCOSE SERPL-MCNC: 180 MG/DL (ref 70–99)
HCT VFR BLD CALC: 40 % (ref 42–52)
HGB BLD-MCNC: 13.3 G/DL (ref 14–18)
MCH RBC QN AUTO: 31.8 PG (ref 25–34)
MCHC RBC AUTO-ENTMCNC: 33.3 G/DL (ref 32–36)
MCV RBC AUTO: 95.7 FL (ref 80–100)
PMV BLD AUTO: 11.3 FL (ref 7.4–10.4)
POTASSIUM SERPL-SCNC: 4.1 MMOL/L (ref 3.5–5.1)
RED CELL DISTRIBUTION WIDTH CV: 15.2 % (ref 11.5–14.5)
RED CELL DISTRIBUTION WIDTH SD: 53.4 FL (ref 36.4–46.3)
SODIUM SERPL-SCNC: 139 MMOL/L (ref 136–145)
WBC # BLD AUTO: 4.6 K/UL (ref 4.8–10.8)

## 2018-02-13 RX ADMIN — ISOSORBIDE MONONITRATE SCH MG: 30 TABLET, EXTENDED RELEASE ORAL at 09:27

## 2018-02-13 RX ADMIN — CLOPIDOGREL BISULFATE SCH MG: 75 TABLET, FILM COATED ORAL at 09:28

## 2018-02-13 RX ADMIN — Medication SCH MG: at 09:28

## 2018-02-13 RX ADMIN — IPRATROPIUM BROMIDE AND ALBUTEROL SULFATE SCH ML: .5; 3 SOLUTION RESPIRATORY (INHALATION) at 02:06

## 2018-02-13 RX ADMIN — IPRATROPIUM BROMIDE AND ALBUTEROL SULFATE SCH ML: .5; 3 SOLUTION RESPIRATORY (INHALATION) at 19:43

## 2018-02-13 RX ADMIN — IPRATROPIUM BROMIDE AND ALBUTEROL SULFATE SCH ML: .5; 3 SOLUTION RESPIRATORY (INHALATION) at 07:40

## 2018-02-13 RX ADMIN — IPRATROPIUM BROMIDE AND ALBUTEROL SULFATE SCH ML: .5; 3 SOLUTION RESPIRATORY (INHALATION) at 14:56

## 2018-02-13 RX ADMIN — METOPROLOL TARTRATE SCH MG: 50 TABLET, FILM COATED ORAL at 22:16

## 2018-02-13 NOTE — PROGRESS NOTE
Subjective


Date of Service:


Feb 13, 2018.


Subjective


pt feels improved but is still coughing, feels overall improved is negative for 

influenza





Problem List


Medical Problems:


(1) COPD exacerbation


Status: Acute  





(2) COPD with exacerbation


Status: Acute  





(3) Elevated troponin


Status: Acute  





(4) Pneumonia


Status: Acute  





(5) Tachyarrhythmia


Status: Acute  





(6) Viral upper respiratory illness


Status: Acute  











Review of Systems


Constitutional:  No fever, No chills, No weakness, No fatigue


Respiratory:  + cough, + dyspnea on exertion, No sputum, No shortness of breath


Cardiac:  No chest pain, No edema


Abdomen:  No pain, No nausea, No vomiting, No diarrhea


Male :  No dysuria, No incontinence


Neurologic:  No memory loss, No weakness





Objective


Vital Signs











  Date Time  Temp Pulse Resp B/P (MAP) Pulse Ox O2 Delivery O2 Flow Rate FiO2


 


2/13/18 07:40  72 18  91 Room Air  


 


2/13/18 07:19 36.9 70 20 132/75 (94) 92 Room Air  


 


2/13/18 02:07  73 18  98 Nasal Cannula 2.0 


 


2/13/18 00:03      Nasal Cannula 2.0 


 


2/12/18 23:33 37.3 80 18 103/63 91   


 


2/12/18 23:30 26.8 75 18 100/57 (71) 94 Nasal Cannula 2.0 


 


2/12/18 22:03 37.3 80 18 103/63 91 Room Air  


 


2/12/18 20:48 37.2 78 18 100/60 91 Room Air  


 


2/12/18 19:14  80      


 


2/12/18 18:02 38.7 91 20 112/66 93 Room Air  











Physical Exam


General Appearance:  WD/WN, + mild distress


Neck:  supple, no JVD


Respiratory/Chest:  chest non-tender, + decreased breath sounds, + accessory 

muscle use


Cardiovascular:  regular rate, rhythm, no murmur


Abdomen:  normal bowel sounds, non tender, soft


Extremities:  no pedal edema, no calf tenderness


Neurologic/Psychiatric:  alert, oriented x 3





Laboratory Results





Last 24 Hours








Test


  2/12/18


18:47 2/12/18


19:04 2/12/18


19:05 2/12/18


19:23


 


White Blood Count 6.59 K/uL    


 


Red Blood Count 4.46 M/uL    


 


Hemoglobin 14.0 g/dL    


 


Hematocrit 42.5 %    


 


Mean Corpuscular Volume 95.3 fL    


 


Mean Corpuscular Hemoglobin 31.4 pg    


 


Mean Corpuscular Hemoglobin


Concent 32.9 g/dl 


  


  


  


 


 


Platelet Count 133 K/uL    


 


Mean Platelet Volume 11.9 fL    


 


Neutrophils (%) (Auto) 77.1 %    


 


Lymphocytes (%) (Auto) 10.8 %    


 


Monocytes (%) (Auto) 10.3 %    


 


Eosinophils (%) (Auto) 0.9 %    


 


Basophils (%) (Auto) 0.3 %    


 


Neutrophils # (Auto) 5.08 K/uL    


 


Lymphocytes # (Auto) 0.71 K/uL    


 


Monocytes # (Auto) 0.68 K/uL    


 


Eosinophils # (Auto) 0.06 K/uL    


 


Basophils # (Auto) 0.02 K/uL    


 


RDW Standard Deviation 53.0 fL    


 


RDW Coefficient of Variation 15.3 %    


 


Immature Granulocyte % (Auto) 0.6 %    


 


Immature Granulocyte # (Auto) 0.04 K/uL    


 


Sodium Level 141 mmol/L    


 


Potassium Level 3.8 mmol/L    


 


Chloride Level 107 mmol/L    


 


Carbon Dioxide Level 27 mmol/L    


 


Anion Gap 7.0 mmol/L    


 


Blood Urea Nitrogen 20 mg/dl    


 


Creatinine 1.18 mg/dl    


 


Est Creatinine Clear Calc


Drug Dose 53.5 ml/min 


  


  


  


 


 


Estimated GFR (


American) 66.7 


  


  


  


 


 


Estimated GFR (Non-


American 57.5 


  


  


  


 


 


BUN/Creatinine Ratio 16.6    


 


Random Glucose 100 mg/dl    


 


Calcium Level 8.8 mg/dl    


 


Creatine Kinase MB 1.2 ng/ml    


 


Troponin I 0.045 ng/ml    


 


Creatine Kinase MB Ratio     


 


Influenza Type A Antigen


  


  


  Neg for Influ


A 


 


 


Influenza Type B Antigen


  


  


  Neg for Influ


B 


 


 


Lactic Acid Level    1.2 mmol/L 


 


Test


  2/12/18


19:35 2/13/18


00:15 2/13/18


04:30 2/13/18


06:00


 


Procalcitonin < 0.05 ng/ml    


 


Troponin I  0.043 ng/ml   


 


Urine Color   DK YELLOW  


 


Urine Appearance   CLEAR  


 


Urine pH   5.0  


 


Urine Specific Gravity   1.033  


 


Urine Protein   TRACE  


 


Urine Glucose (UA)   NEG  


 


Urine Ketones   TRACE  


 


Urine Occult Blood   1+  


 


Urine Nitrite   NEG  


 


Urine Bilirubin   NEG  


 


Urine Urobilinogen   NEG  


 


Urine Leukocyte Esterase   NEG  


 


Urine WBC (Auto)   1-5 /hpf  


 


Urine RBC (Auto)   5-10 /hpf  


 


Urine Hyaline Casts (Auto)   10-30 /lpf  


 


Urine Epithelial Cells (Auto)   20-30 /lpf  


 


Urine Bacteria (Auto)   NEG  


 


White Blood Count    4.60 K/uL 


 


Red Blood Count    4.18 M/uL 


 


Hemoglobin    13.3 g/dL 


 


Hematocrit    40.0 % 


 


Mean Corpuscular Volume    95.7 fL 


 


Mean Corpuscular Hemoglobin    31.8 pg 


 


Mean Corpuscular Hemoglobin


Concent 


  


  


  33.3 g/dl 


 


 


RDW Standard Deviation    53.4 fL 


 


RDW Coefficient of Variation    15.2 % 


 


Platelet Count    122 K/uL 


 


Mean Platelet Volume    11.3 fL 


 


Sodium Level    139 mmol/L 


 


Potassium Level    4.1 mmol/L 


 


Chloride Level    107 mmol/L 


 


Carbon Dioxide Level    23 mmol/L 


 


Anion Gap    9.0 mmol/L 


 


Blood Urea Nitrogen    26 mg/dl 


 


Creatinine    1.32 mg/dl 


 


Est Creatinine Clear Calc


Drug Dose 


  


  


  47.4 ml/min 


 


 


Estimated GFR (


American) 


  


  


  58.2 


 


 


Estimated GFR (Non-


American 


  


  


  50.2 


 


 


BUN/Creatinine Ratio    19.6 


 


Random Glucose    180 mg/dl 


 


Calcium Level    8.3 mg/dl 


 


Magnesium Level    2.3 mg/dl 


 


Chemistry Specimen Hemolysis     











Assessment and Plan


81 y/o M acute on chronic respiratory failure and febrile illness suspect 

influenza





Acute on chronic respiratory failure, Hx COPD - was given steroids in ER, 

continue nebulized meds and 02 protocol as needed.  





Febrile illness suspect pulmonary source, tamiflu stopped, pcr negative, 

azithormycin for bacterial bronchitis





Chronic systolic CHF - clinically euvolemic - he takes Lasix PRN only -EF 25%, 

stop ivf, continue metoprolol, losartan, .





CAD - cont B blocker, ASA, Plavix, statin, Imdur - no clinical symptoms of acs





Full code - Heparin prophylaxis

## 2018-02-13 NOTE — HOSPITALIST PROGRESS NOTE
Hospitalist Progress Note


Date of Service


Feb 13, 2018.


 (Lexis Hernandez .LEANDER)





Subjective


Pt evaluation today including:  conversation w/ patient, conversation w/ family

, physical exam, chart review, lab review, review of inpatient medication list


Voiding:  no voiding problems


Mr. Hansen is feeling much better today. He is coughing with production of 

clear sputum. He is not short of breath. He has been afebrile since yesterday 

afternoon. 





ROS


Constitutional: no chills, aches, sweats or fever


Respiratory: see HPI 


Cardiac: no chest pain, palpitations, edema, orthopnea or lightheadedness


GI: no abdominal pain, nausea, vomiting, diarrhea or constipation


: no dysuria or hesitancy


Extremities: no joint pain or weakness


Skin: no rash





All other systems reviewed and negative


 (Lexis Hernandez .LEANDER)





Medications





Medications Administered








 Medications


  (Trade)  Dose


 Ordered  Sig/Carla


 Route  Start Time


 Stop Time Status Last Admin


Dose Admin


 


 Sodium Chloride  1,000 ml @ 


 75 mls/hr  [id number]


 IV  2/13/18 00:00


 2/13/18 09:09 DC 2/13/18 00:32


75 MLS/HR


 


 Acetaminophen


  (Tylenol Tab)  1,000 mg  NOW  STAT


 PO  2/12/18 19:33


 2/12/18 19:34 DC 2/12/18 19:47


1,000 MG


 


 Albuterol Sulfate


  (Ventolin 0.5%


 2.5MG/0.5ML Neb)  2.5 mg  STK-MED ONCE


 INH  2/12/18 19:42


 2/12/18 19:43 DC 2/12/18 19:49


2.5 MG


 


 Methylprednisolone


 Sodium Succinate


 60 mg/Syringe  0.96 ml @ 


 1.5 mls/min  NOW  STAT


 IV  2/12/18 20:16


 2/12/18 20:17 DC 2/12/18 20:48


1.5 MLS/MIN


 


 Albuterol/


 Ipratropium


  (Duoneb)  3 ml  ONE  STAT


 INH  2/12/18 20:16


 2/12/18 20:17 DC 2/12/18 20:48


3 ML


 


 Aspirin


  (Ecotrin Tab)  81 mg  DAILY


 PO  2/13/18 08:00


 3/15/18 08:59  2/13/18 09:28


81 MG


 


 Clopidogrel


 Bisulfate


  (plAVix TAB)  75 mg  DAILY


 PO  2/13/18 08:00


 3/15/18 08:59  2/13/18 09:28


75 MG


 


 Isosorbide


 Mononitrate


  (Imdur Ext Rel


 Tab)  30 mg  DAILY


 PO  2/13/18 08:00


 3/15/18 08:59  2/13/18 09:27


30 MG


 


 Metoprolol


 Tartrate


  (Lopressor Tab)  100 mg  DAILY


 PO  2/13/18 08:00


 3/15/18 08:59  2/13/18 09:28


100 MG


 


 Albuterol/


 Ipratropium


  (Duoneb)  3 ml  Q6H


 INH  2/12/18 21:15


 3/14/18 21:14  2/13/18 14:56


3 ML


 


 Oseltamivir


 Phosphate


  (Tamiflu Susp)  30 mg  BID


 PO  2/13/18 08:00


 2/18/18 08:59  2/13/18 09:29


30 MG


 


 Azithromycin


  (Zithromax Tab)  500 mg  NOW  ONCE


 PO  2/13/18 09:45


 2/13/18 09:46 DC 2/13/18 10:27


500 MG








 (Lexis Hernandez, LEANDER)





Objective


Vital Signs











  Date Time  Temp Pulse Resp B/P (MAP) Pulse Ox O2 Delivery O2 Flow Rate FiO2


 


2/13/18 14:57  75 18  91 Room Air  


 


2/13/18 08:00     91 Room Air 2.0 


 


2/13/18 07:40  72 18  91 Room Air  


 


2/13/18 07:19 36.9 70 20 132/75 (94) 92 Room Air  


 


2/13/18 02:07  73 18  98 Nasal Cannula 2.0 


 


2/13/18 00:03      Nasal Cannula 2.0 


 


2/12/18 23:33 37.3 80 18 103/63 91   


 


2/12/18 23:30 26.8 75 18 100/57 (71) 94 Nasal Cannula 2.0 


 


2/12/18 22:03 37.3 80 18 103/63 91 Room Air  


 


2/12/18 20:48 37.2 78 18 100/60 91 Room Air  


 


2/12/18 19:14  80      


 


2/12/18 18:02 38.7 91 20 112/66 93 Room Air  








 (Lexis Hernandez CRNP)





Physical Exam


Notes:


General: no distress


Eyes: normal inspection, PERLL


Respiratory: chest non tender, clear to auscultation, no respiratory distress, 

no accessory muscle use


Cardiac: regular rate and rhythm, no rub or gallop, no murmur, no edema, no jvd


GI/: active bowel sounds, no abd pain or tenderness, soft, non distended


Extremities: normal range of motion, normal strength, non tender 


Neuro/Psych: alert and oriented x 3, normal mood and affect


Skin: normal color, dry


 (Lexis Hernandez ., CRNP)





Laboratory Results





Last 24 Hours








Test


  2/12/18


18:47 2/12/18


19:04 2/12/18


19:05 2/12/18


19:23


 


White Blood Count 6.59 K/uL    


 


Red Blood Count 4.46 M/uL    


 


Hemoglobin 14.0 g/dL    


 


Hematocrit 42.5 %    


 


Mean Corpuscular Volume 95.3 fL    


 


Mean Corpuscular Hemoglobin 31.4 pg    


 


Mean Corpuscular Hemoglobin


Concent 32.9 g/dl 


  


  


  


 


 


Platelet Count 133 K/uL    


 


Mean Platelet Volume 11.9 fL    


 


Neutrophils (%) (Auto) 77.1 %    


 


Lymphocytes (%) (Auto) 10.8 %    


 


Monocytes (%) (Auto) 10.3 %    


 


Eosinophils (%) (Auto) 0.9 %    


 


Basophils (%) (Auto) 0.3 %    


 


Neutrophils # (Auto) 5.08 K/uL    


 


Lymphocytes # (Auto) 0.71 K/uL    


 


Monocytes # (Auto) 0.68 K/uL    


 


Eosinophils # (Auto) 0.06 K/uL    


 


Basophils # (Auto) 0.02 K/uL    


 


RDW Standard Deviation 53.0 fL    


 


RDW Coefficient of Variation 15.3 %    


 


Immature Granulocyte % (Auto) 0.6 %    


 


Immature Granulocyte # (Auto) 0.04 K/uL    


 


Sodium Level 141 mmol/L    


 


Potassium Level 3.8 mmol/L    


 


Chloride Level 107 mmol/L    


 


Carbon Dioxide Level 27 mmol/L    


 


Anion Gap 7.0 mmol/L    


 


Blood Urea Nitrogen 20 mg/dl    


 


Creatinine 1.18 mg/dl    


 


Est Creatinine Clear Calc


Drug Dose 53.5 ml/min 


  


  


  


 


 


Estimated GFR (


American) 66.7 


  


  


  


 


 


Estimated GFR (Non-


American 57.5 


  


  


  


 


 


BUN/Creatinine Ratio 16.6    


 


Random Glucose 100 mg/dl    


 


Calcium Level 8.8 mg/dl    


 


Creatine Kinase MB 1.2 ng/ml    


 


Troponin I 0.045 ng/ml    


 


Creatine Kinase MB Ratio     


 


Influenza Type A Antigen


  


  


  Neg for Influ


A 


 


 


Influenza Type B Antigen


  


  


  Neg for Influ


B 


 


 


Lactic Acid Level    1.2 mmol/L 


 


Test


  2/12/18


19:35 2/13/18


00:15 2/13/18


04:30 2/13/18


06:00


 


Procalcitonin < 0.05 ng/ml    


 


Troponin I  0.043 ng/ml   


 


Urine Color   DK YELLOW  


 


Urine Appearance   CLEAR  


 


Urine pH   5.0  


 


Urine Specific Gravity   1.033  


 


Urine Protein   TRACE  


 


Urine Glucose (UA)   NEG  


 


Urine Ketones   TRACE  


 


Urine Occult Blood   1+  


 


Urine Nitrite   NEG  


 


Urine Bilirubin   NEG  


 


Urine Urobilinogen   NEG  


 


Urine Leukocyte Esterase   NEG  


 


Urine WBC (Auto)   1-5 /hpf  


 


Urine RBC (Auto)   5-10 /hpf  


 


Urine Hyaline Casts (Auto)   10-30 /lpf  


 


Urine Epithelial Cells (Auto)   20-30 /lpf  


 


Urine Bacteria (Auto)   NEG  


 


White Blood Count    4.60 K/uL 


 


Red Blood Count    4.18 M/uL 


 


Hemoglobin    13.3 g/dL 


 


Hematocrit    40.0 % 


 


Mean Corpuscular Volume    95.7 fL 


 


Mean Corpuscular Hemoglobin    31.8 pg 


 


Mean Corpuscular Hemoglobin


Concent 


  


  


  33.3 g/dl 


 


 


RDW Standard Deviation    53.4 fL 


 


RDW Coefficient of Variation    15.2 % 


 


Platelet Count    122 K/uL 


 


Mean Platelet Volume    11.3 fL 


 


Sodium Level    139 mmol/L 


 


Potassium Level    4.1 mmol/L 


 


Chloride Level    107 mmol/L 


 


Carbon Dioxide Level    23 mmol/L 


 


Anion Gap    9.0 mmol/L 


 


Blood Urea Nitrogen    26 mg/dl 


 


Creatinine    1.32 mg/dl 


 


Est Creatinine Clear Calc


Drug Dose 


  


  


  47.4 ml/min 


 


 


Estimated GFR (


American) 


  


  


  58.2 


 


 


Estimated GFR (Non-


American 


  


  


  50.2 


 


 


BUN/Creatinine Ratio    19.6 


 


Random Glucose    180 mg/dl 


 


Calcium Level    8.3 mg/dl 


 


Magnesium Level    2.3 mg/dl 


 


Chemistry Specimen Hemolysis     


 


Test


  2/13/18


10:30 


  


  


 


 


Influenza Type A (RT-PCR)


  Neg for Influ


A 


  


  


 


 


Influenza Type B (RT-PCR)


  Neg for Influ


B 


  


  


 








 (Lexis Hernandez CRNP)





Assessment and Plan


83 y/o M Hx HTN, HPL, CAD, COPD, chronic systolic CHF.  





COPD exacerbation/viral respiratory illness


- Flu pcr negative - discontinue Tamiflu


- continue azithromycin, nebs 


- on room air


- no need for steroids at this time





CHF 


- continue lasix prn - does not appear to be fluid overloaded 


- Cont B blocker, ARB.





CAD - cont B blocker, ASA, Plavix, statin, Imdur 


- Trop negative x 3


- no chest pain








Full code - Heparin prophylaxis 


 (Lexis Hernandez ., LEANDER)

## 2018-02-14 VITALS
OXYGEN SATURATION: 94 % | DIASTOLIC BLOOD PRESSURE: 64 MMHG | SYSTOLIC BLOOD PRESSURE: 102 MMHG | TEMPERATURE: 98.24 F | HEART RATE: 72 BPM

## 2018-02-14 VITALS
OXYGEN SATURATION: 94 % | DIASTOLIC BLOOD PRESSURE: 64 MMHG | TEMPERATURE: 98.24 F | SYSTOLIC BLOOD PRESSURE: 102 MMHG | HEART RATE: 72 BPM

## 2018-02-14 VITALS
OXYGEN SATURATION: 95 % | HEART RATE: 84 BPM | SYSTOLIC BLOOD PRESSURE: 118 MMHG | TEMPERATURE: 98.42 F | DIASTOLIC BLOOD PRESSURE: 68 MMHG

## 2018-02-14 VITALS — OXYGEN SATURATION: 95 %

## 2018-02-14 VITALS — OXYGEN SATURATION: 92 % | HEART RATE: 80 BPM

## 2018-02-14 VITALS — HEART RATE: 81 BPM | OXYGEN SATURATION: 95 %

## 2018-02-14 RX ADMIN — Medication SCH MG: at 07:41

## 2018-02-14 RX ADMIN — ISOSORBIDE MONONITRATE SCH MG: 30 TABLET, EXTENDED RELEASE ORAL at 07:41

## 2018-02-14 RX ADMIN — METOPROLOL TARTRATE SCH MG: 50 TABLET, FILM COATED ORAL at 07:42

## 2018-02-14 RX ADMIN — IPRATROPIUM BROMIDE AND ALBUTEROL SULFATE SCH ML: .5; 3 SOLUTION RESPIRATORY (INHALATION) at 07:08

## 2018-02-14 RX ADMIN — CLOPIDOGREL BISULFATE SCH MG: 75 TABLET, FILM COATED ORAL at 07:42

## 2018-02-14 RX ADMIN — IPRATROPIUM BROMIDE AND ALBUTEROL SULFATE SCH ML: .5; 3 SOLUTION RESPIRATORY (INHALATION) at 01:42

## 2018-02-14 NOTE — DISCHARGE INSTRUCTIONS
Discharge Instructions


Date of Service


Feb 14, 2018.





Admission


Reason for Admission:  Fever, Shortness Of Breath





Discharge


Discharge Diagnosis / Problem:  COPD exacerbation





Discharge Goals


Goal(s):  Improve disease control





Activity Recommendations


Activity Limitations:  resume your previous activity





.





Instructions / Follow-Up


Instructions / Follow-Up


Please follow with your primary care provider within about a week





Current Hospital Diet


Patient's current hospital diet: AHA Diet (Heart Healthy), Low Sodium Diet (2gm 

Na)





Discharge Diet


Recommended Diet:  AHA Diet (Heart Healthy), Low Sodium Diet (2gm Na)





Procedures


Procedures Performed:  


Chest Xray





Pending Studies


Studies pending at discharge:  no





Medical Emergencies








.


Who to Call and When:





Medical Emergencies:  If at any time you feel your situation is an emergency, 

please call 911 immediately.





.





Non-Emergent Contact


Non-Emergency issues call your:  Primary Care Provider


Call Non-Emergent contact if:  you have a fever, you have any medication 

questions





.


.








"Provider Documentation" section prepared by Lexis Hernandez.








.





VTE Core Measure


Inpt VTE Proph given/why not?:  Unfractionated heparin SQ

## 2018-02-14 NOTE — DISCHARGE SUMMARY
Discharge Summary


Date of Service


Feb 14, 2018.





Discharge Summary


Admission Date:


Feb 12, 2018 at 21:33


Discharge Date:  Feb 14, 2018


Discharge Disposition:  Home


Principal Diagnosis:  COPD exacerbation


Problems/Secondary Diagnoses:


HTN, HPL, CAD, chronic systolic CHF.


Immunizations:  


   Have You Had Influenza Vaccine:  No


   Influenza Vaccine Date:  Oct 16, 2011


   History of Tetanus Vaccine?:  Yes


   Tetanus Immunization Date:  Jan 21, 2004


   History of Pneumococcal:  Yes


   Pneumococcal Date:  Dec 21, 2005


   History of Hepatitis B Vaccine:  No


Procedures:


CHEST ONE VIEW PORTABLE





CLINICAL HISTORY: SOB dyspnea





COMPARISON STUDY:  11/3/2017





FINDINGS: Mild stable cardiomegaly. Prior median sternotomy. Cardiac


pacemaker/defibrillator in good position. Small fixed lateral hernia. Lungs are


clear. Diaphragms smooth. 





IMPRESSION:  Chronic and postoperative change. No acute process





Medication Reconciliation


New Medications:  


Metoprolol Succinate (Toprol Xl) 100 Mg Tabcr


1 TAB PO DAILY for 30 Days, #30 TAB 5 Refills





Azithromycin (Azithromycin) 250 Mg Tab


250 MG PO QAM for 3 Days, #3 TAB





Furosemide (Furosemide) 40 Mg Tab


40 MG PO DAILY PRN for EDEMA for 30 Days, #30 TAB





 


Continued Medications:  


Albuterol Hfa (Ventolin Hfa) 200 Puffs/01084 Mcg Aers


1-2 PUFFS INH DAILY PRN for SOB/Wheezing, #1 INHALER





Albuterol Sulfate (Albuterol Sulfate) 1.25 Mg/3 Ml Neb


1 VIAL NEB BID for 5 Days, #75 ML





Ascorbic Acid (Vitamin C) 100 Mg Tab


100 MG PO BID





Aspirin (Aspirin Ec) 81 Mg Tab


81 MG PO DAILY





Cholecalciferol (Vitamin D3) 1,000 Unit Tab


1000 MG PO DAILY for 90 Days, TAB 3 Refills





Clopidogrel (Plavix) 75 Mg Tab


75 MG PO DAILY, TAB





Coenzyme Q10 (Ubidecarenone) (Coq-10) 100 Mg Cap


100 MG PO QPM





Fish Oil (Omega-3) 1 Ea Cap


1200 MG PO DAILY





Glucosamine-Chondroitin-Vit C- (Glucosamine Chondroitin) 1 Cap Cap


1 CAP PO BID


take 1600/1100 mg twice a day


Isosorbide Mononitrate Ext Rel (Imdur Ext Rel) 30 Mg Tabcr


30 MG PO DAILY





Lecithin (Lecithin 3500) 1,200 Mg Cap


1200 MG PO DAILY





Losartan Potassium (Cozaar) 25 Mg Tab


25 MG PO QPM for 30 Days, TAB 5 Refills





Mometasone Furoate-Formoterol (Dulera 200/5 Mcg) 1 Aer Aer


2 PUFFS INH BID for 30 Days, #13 GM 5 Refills





Multivitamin (Multivitamin)  Tab


1 TAB PO DAILY, TAB





Nitroglycerin (Nitrostat) 0.4 Mg Tab


0.4 MG UT PRN





Ranitidine (Zantac) 150 Mg Tab


75 MG PO QPM, TAB





Simvastatin (Zocor) 40 Mg Tab


40 MG PO QPM





Terazosin Hcl (Hytrin) 2 Mg Cap


2 MG PO QPM, CAP





 


Discontinued Medications:  


Furosemide (Lasix) 40 Mg Tab


45 MG PO DAILY PRN for EDEMA, TAB





Metoprolol Tartrate (Lopressor) (Lopressor) 100 Mg Tab


100 MG PO DAILY, TAB











Discharge Exam


ROS


Constitutional: no chills, aches, sweats or fever


Respiratory: no sob,mild cough, no sputum, or wheezing


Cardiac: no chest pain, palpitations, edema, orthopnea or lightheadedness


GI: no abdominal pain, nausea, vomiting, diarrhea or constipation


: no dysuria or hesitancy


Extremities: no joint pain or weakness


Skin: no rash





All other systems reviewed and negative





PE


General: no distress


Eyes: normal inspection, PERLL


Respiratory: chest non tender, faint expiratory wheezes in bases, no 

respiratory distress, no accessory muscle use


Cardiac: regular rate and rhythm, no rub or gallop, no murmur, no edema, no jvd


GI/: active bowel sounds, no abd pain or tenderness, soft, non distended


Extremities: normal range of motion, normal strength, non tender 


Neuro/Psych: alert and oriented x 3, normal mood and affect


Skin: normal color, dry





Hospital Course


83 y/o M Hx HTN, HPL, CAD, COPD, chronic systolic CHF.  Patient presented to 

the ED with fever, cough and sob. 





COPD exacerbation/viral respiratory illness


- Flu pcr negative - discontinued Tamiflu


- continue azithromycin started 2/13 x 5 days, nebs 


- on room air


- no need for steroids at this time


- he feels he is at about his baseline respiratory status today. Ambulated 

halls without difficulty





CHF 


- continue lasix prn - does not appear to be fluid overloaded 


- Cont B blocker, ARB.





CAD - cont B blocker, ASA, Plavix, statin, Imdur 


- Trop negative x 3


- no chest pain





NP Physician Supervision Note:





I interviewed and examined the patient. Discussed with Lexis Hernandez NP and 

agree with findings and plan as documented in the note. Any exceptions or 

clarifications are listed here: None





Patient looks well today's accompanied by his wife and daughter at the bedside 

he has no complaint or problems he does still have a slight cough


His vital signs show temperature 36 9 pulse 84 respirations rate 20 /68 

O2 sat on room air is acceptable


His lungs are clear without evidence of focal air loss or wheeze





Patient is discharged with a COPD exacerbation bronchitis on azithromycin with 

follow-up with his primary care provider





Documented By:  Sumanth Jean Baptiste





Total Time Spent:  Greater than 30 minutes


This includes examination of the patient, discharge planning, medication 

reconciliation, and communication with other providers.





Discharge Instructions


Please refer to the electronic Patient Visit Report (Discharge Instructions) 

for additional information.





Follow-Up


Pcp in about a week

## 2018-05-17 ENCOUNTER — HOSPITAL ENCOUNTER (OUTPATIENT)
Dept: HOSPITAL 45 - C.LABPVFM | Age: 83
Discharge: HOME | End: 2018-05-17
Attending: FAMILY MEDICINE
Payer: COMMERCIAL

## 2018-05-17 DIAGNOSIS — I10: Primary | ICD-10-CM

## 2018-05-17 DIAGNOSIS — E78.5: ICD-10-CM

## 2018-05-17 DIAGNOSIS — I25.10: ICD-10-CM

## 2018-05-17 LAB
ALBUMIN SERPL-MCNC: 3.5 GM/DL (ref 3.4–5)
ALP SERPL-CCNC: 64 U/L (ref 45–117)
ALT SERPL-CCNC: 25 U/L (ref 12–78)
AST SERPL-CCNC: 25 U/L (ref 15–37)
BUN SERPL-MCNC: 49 MG/DL (ref 7–18)
CALCIUM SERPL-MCNC: 9.1 MG/DL (ref 8.5–10.1)
CO2 SERPL-SCNC: 28 MMOL/L (ref 21–32)
CREAT SERPL-MCNC: 1.54 MG/DL (ref 0.6–1.4)
GLUCOSE SERPL-MCNC: 94 MG/DL (ref 70–99)
KETONES UR QL STRIP: 71 MG/DL
PH UR: 147 MG/DL (ref 0–200)
POTASSIUM SERPL-SCNC: 3.2 MMOL/L (ref 3.5–5.1)
PROT SERPL-MCNC: 6.7 GM/DL (ref 6.4–8.2)
SODIUM SERPL-SCNC: 142 MMOL/L (ref 136–145)

## 2018-08-03 ENCOUNTER — HOSPITAL ENCOUNTER (OUTPATIENT)
Dept: HOSPITAL 45 - C.LAB1850 | Age: 83
Discharge: HOME | End: 2018-08-03
Attending: INTERNAL MEDICINE
Payer: COMMERCIAL

## 2018-08-03 DIAGNOSIS — I47.2: Primary | ICD-10-CM

## 2018-08-03 LAB
BUN SERPL-MCNC: 45 MG/DL (ref 7–18)
CALCIUM SERPL-MCNC: 9 MG/DL (ref 8.5–10.1)
CO2 SERPL-SCNC: 27 MMOL/L (ref 21–32)
CREAT SERPL-MCNC: 1.37 MG/DL (ref 0.6–1.4)
EOSINOPHIL NFR BLD AUTO: 143 K/UL (ref 130–400)
GLUCOSE SERPL-MCNC: 94 MG/DL (ref 70–99)
HCT VFR BLD CALC: 39 % (ref 42–52)
HGB BLD-MCNC: 12.8 G/DL (ref 14–18)
MCH RBC QN AUTO: 30.8 PG (ref 25–34)
MCHC RBC AUTO-ENTMCNC: 32.8 G/DL (ref 32–36)
MCV RBC AUTO: 94 FL (ref 80–100)
PMV BLD AUTO: 12.1 FL (ref 7.4–10.4)
POTASSIUM SERPL-SCNC: 3.4 MMOL/L (ref 3.5–5.1)
RED CELL DISTRIBUTION WIDTH CV: 15.6 % (ref 11.5–14.5)
RED CELL DISTRIBUTION WIDTH SD: 53.2 FL (ref 36.4–46.3)
SODIUM SERPL-SCNC: 141 MMOL/L (ref 136–145)
WBC # BLD AUTO: 6.95 K/UL (ref 4.8–10.8)